# Patient Record
Sex: FEMALE | Race: BLACK OR AFRICAN AMERICAN | HISPANIC OR LATINO | URBAN - METROPOLITAN AREA
[De-identification: names, ages, dates, MRNs, and addresses within clinical notes are randomized per-mention and may not be internally consistent; named-entity substitution may affect disease eponyms.]

---

## 2017-08-24 PROBLEM — 70153002 HEMORRHOIDS: Status: ACTIVE | Noted: 2017-08-24

## 2017-09-21 PROBLEM — 62315008 DIARRHEA: Status: ACTIVE | Noted: 2017-09-21

## 2017-09-21 PROBLEM — 59621000 ESSENTIAL HYPERTENSION: Status: ACTIVE | Noted: 2017-09-21

## 2017-09-21 PROBLEM — 313436004 TYPE II DIABETES MELLITUS WITHOUT COMPLICATION: Status: ACTIVE | Noted: 2017-09-21

## 2017-11-01 PROBLEM — 429699009 HISTORY OF MALIGNANT NEOPLASM OF COLON: Status: ACTIVE | Noted: 2017-09-21

## 2017-11-01 PROBLEM — 92343006 BENIGN NEOPLASM OF SIGMOID COLON: Status: ACTIVE | Noted: 2017-11-01

## 2017-11-01 PROBLEM — 92318000 BENIGN NEOPLASM OF RECTUM: Status: ACTIVE | Noted: 2017-11-01

## 2017-11-01 PROBLEM — 91985000 BENIGN NEOPLASM OF ASCENDING COLON: Status: ACTIVE | Noted: 2017-11-01

## 2017-11-01 PROBLEM — 92076000 BENIGN NEOPLASM OF DESCENDING COLON: Status: ACTIVE | Noted: 2017-11-01

## 2021-09-20 ENCOUNTER — OFFICE VISIT (OUTPATIENT)
Dept: URBAN - METROPOLITAN AREA CLINIC 82 | Facility: CLINIC | Age: 66
End: 2021-09-20

## 2021-10-29 ENCOUNTER — LAB OUTSIDE AN ENCOUNTER (OUTPATIENT)
Dept: URBAN - METROPOLITAN AREA CLINIC 82 | Facility: CLINIC | Age: 66
End: 2021-10-29

## 2021-10-29 ENCOUNTER — OFFICE VISIT (OUTPATIENT)
Dept: URBAN - METROPOLITAN AREA CLINIC 82 | Facility: CLINIC | Age: 66
End: 2021-10-29
Payer: MEDICARE

## 2021-10-29 DIAGNOSIS — Z85.038 PERSONAL HISTORY OF COLON CANCER, STAGE III: ICD-10-CM

## 2021-10-29 DIAGNOSIS — Z86.010 PERSONAL HISTORY OF COLONIC POLYPS: ICD-10-CM

## 2021-10-29 DIAGNOSIS — D50.0 IRON DEFICIENCY ANEMIA DUE TO CHRONIC BLOOD LOSS: ICD-10-CM

## 2021-10-29 PROCEDURE — 99203 OFFICE O/P NEW LOW 30 MIN: CPT | Performed by: INTERNAL MEDICINE

## 2021-10-29 RX ORDER — METOCLOPRAMIDE HYDROCHLORIDE 10 MG/1
1 TABLET BEFORE MEALS FOR NAUSEA AND VOMITING. TAKE ONE PRIOR TO BOWEL PREP TABLET ORAL TWICE A DAY
Qty: 14 TABLET | Refills: 0 | OUTPATIENT
Start: 2021-10-29

## 2021-10-29 RX ORDER — POLYETHYLENE GLYCOL 3350 17 G/17G
AS DIRECTED PRIOR TO COLONOSCOPY POWDER, FOR SOLUTION ORAL ONCE
Qty: 238  GRAM | Refills: 0 | OUTPATIENT
Start: 2021-10-29 | End: 2021-10-30

## 2021-10-29 NOTE — HPI-TODAY'S VISIT:
Ms. Polly Mason is a 66-year-old  female came into the office for the evaluation of surveillance colonoscopy evaluation.  She is new to our practice used to see Magda pérez and Dr. Ortiz.  She was diagnosed with colon cancer right colon cancer in 2014 underwent right hemicolectomy as per records from the oncology she has stage III N0 M0 that required chemotherapy.  Her last colonoscopy was in 2017 and was noted to have multiple colon polyps.  However patient has not had a follow-up surveillance colonoscopy since then.  Currently she denies any abdominal pain she reports occasional nausea and a poor appetite.  She also reports diabetes is controlled on insulin.  Denies any vomiting.  She reports having history of iron deficiency anemia, she still have a hemoglobin around 10 do not have records available to confirm..  Denies any rectal bleeding.

## 2022-01-10 ENCOUNTER — OFFICE VISIT (OUTPATIENT)
Dept: URBAN - METROPOLITAN AREA SURGERY CENTER 13 | Facility: SURGERY CENTER | Age: 67
End: 2022-01-10

## 2022-08-08 ENCOUNTER — LAB OUTSIDE AN ENCOUNTER (OUTPATIENT)
Dept: URBAN - METROPOLITAN AREA CLINIC 82 | Facility: CLINIC | Age: 67
End: 2022-08-08

## 2022-08-08 ENCOUNTER — OFFICE VISIT (OUTPATIENT)
Dept: URBAN - METROPOLITAN AREA CLINIC 82 | Facility: CLINIC | Age: 67
End: 2022-08-08
Payer: MEDICARE

## 2022-08-08 VITALS
BODY MASS INDEX: 28.87 KG/M2 | TEMPERATURE: 98.3 F | HEART RATE: 82 BPM | SYSTOLIC BLOOD PRESSURE: 145 MMHG | WEIGHT: 179.6 LBS | DIASTOLIC BLOOD PRESSURE: 66 MMHG | HEIGHT: 66 IN

## 2022-08-08 DIAGNOSIS — Z85.030 PERSONAL HISTORY OF MALIGNANT CARCINOID TUMOR OF LARGE INTESTINE: ICD-10-CM

## 2022-08-08 DIAGNOSIS — K57.30 DIVERTICULOSIS OF LARGE INTESTINE WITHOUT PERFORATION OR ABSCESS WITHOUT BLEEDING: ICD-10-CM

## 2022-08-08 DIAGNOSIS — Z85.038 PERSONAL HISTORY OF COLON CANCER, STAGE III: ICD-10-CM

## 2022-08-08 DIAGNOSIS — D50.0 IRON DEFICIENCY ANEMIA DUE TO CHRONIC BLOOD LOSS: ICD-10-CM

## 2022-08-08 PROCEDURE — 99212 OFFICE O/P EST SF 10 MIN: CPT | Performed by: INTERNAL MEDICINE

## 2022-08-08 RX ORDER — METOCLOPRAMIDE HYDROCHLORIDE 10 MG/1
1 TABLET BEFORE MEALS FOR NAUSEA AND VOMITING. TAKE ONE PRIOR TO BOWEL PREP TABLET ORAL TWICE A DAY
Qty: 14 TABLET | Refills: 0 | Status: ACTIVE | COMMUNITY
Start: 2021-10-29

## 2022-08-08 NOTE — HPI-TODAY'S VISIT:
68 y/o  female with hx of CRC stage 3 s/p post Right hemicolectomy that came for f/up and schedule surveillance colonoscopy.Denies GI bleeding, refer weight gain.No smoke.refer anemia and soft stools.last colonoscopy 2017 with polyps.She was no show for colo 01/2022 with Dr Orellana.Labs shows anemia

## 2022-09-01 ENCOUNTER — OFFICE VISIT (OUTPATIENT)
Dept: URBAN - METROPOLITAN AREA SURGERY CENTER 13 | Facility: SURGERY CENTER | Age: 67
End: 2022-09-01

## 2022-09-02 ENCOUNTER — OUT OF OFFICE VISIT (OUTPATIENT)
Dept: URBAN - METROPOLITAN AREA MEDICAL CENTER 26 | Facility: MEDICAL CENTER | Age: 67
End: 2022-09-02
Payer: MEDICARE

## 2022-09-02 DIAGNOSIS — K63.89 BACTERIAL OVERGROWTH SYNDROME: ICD-10-CM

## 2022-09-02 DIAGNOSIS — Z85.038 H/O COLON CANCER, STAGE I: ICD-10-CM

## 2022-09-02 DIAGNOSIS — D64.89 ANEMIA DUE TO OTHER CAUSE: ICD-10-CM

## 2022-09-02 PROCEDURE — 45385 COLONOSCOPY W/LESION REMOVAL: CPT | Performed by: INTERNAL MEDICINE

## 2022-09-02 PROCEDURE — 99203 OFFICE O/P NEW LOW 30 MIN: CPT | Performed by: INTERNAL MEDICINE

## 2022-10-24 ENCOUNTER — OFFICE VISIT (OUTPATIENT)
Dept: URBAN - METROPOLITAN AREA CLINIC 82 | Facility: CLINIC | Age: 67
End: 2022-10-24
Payer: MEDICARE

## 2022-10-24 VITALS
HEART RATE: 80 BPM | BODY MASS INDEX: 27.93 KG/M2 | TEMPERATURE: 97.5 F | WEIGHT: 173.8 LBS | SYSTOLIC BLOOD PRESSURE: 124 MMHG | DIASTOLIC BLOOD PRESSURE: 69 MMHG | RESPIRATION RATE: 16 BRPM | HEIGHT: 66 IN

## 2022-10-24 DIAGNOSIS — R19.7 DIARRHEA, UNSPECIFIED TYPE: ICD-10-CM

## 2022-10-24 DIAGNOSIS — Z86.2 H/O SICKLE CELL ANEMIA: ICD-10-CM

## 2022-10-24 DIAGNOSIS — Z85.038 PERSONAL HISTORY OF COLON CANCER, STAGE III: ICD-10-CM

## 2022-10-24 PROBLEM — 724556004: Status: ACTIVE | Noted: 2021-10-29

## 2022-10-24 PROBLEM — 417279003: Status: ACTIVE | Noted: 2022-10-24

## 2022-10-24 PROBLEM — 123741000119102: Status: ACTIVE | Noted: 2017-08-24

## 2022-10-24 PROCEDURE — 99213 OFFICE O/P EST LOW 20 MIN: CPT | Performed by: STUDENT IN AN ORGANIZED HEALTH CARE EDUCATION/TRAINING PROGRAM

## 2022-10-24 RX ORDER — METOCLOPRAMIDE HYDROCHLORIDE 10 MG/1
1 TABLET BEFORE MEALS FOR NAUSEA AND VOMITING. TAKE ONE PRIOR TO BOWEL PREP TABLET ORAL TWICE A DAY
Qty: 14 TABLET | Refills: 0 | Status: ACTIVE | COMMUNITY
Start: 2021-10-29

## 2022-10-24 NOTE — HPI-TODAY'S VISIT:
68 y/o  female with hx of CRC stage 3 s/p right hemicolectomy that came for f/up for recent hospitalization from 9/1/22-9/3/22. Patient experienced a syncopal episode during her colonoscopy prep due to not understanding that she needs to hold her insulin.  While in the ED, hgb 9 prompted a colonoscopy that was completed by Dr. Castro. Colonoscopy showed  8 mm polyp in the transverse colon w/ bx result of hyperplastic polyp, recommend to repeat in 5 years.   At today visit, patient admits diarrhea after colonoscopy procedure. Denies any blood but does reports of foul odor. BM 2-3 per day. No abd pain, n/v, fatigue or weakness. Patients admits taking 2 OTC loperamide per day, which then caused constipation. However, will continue to experience diarrhea w.out the med. Hgb right before leaving the hospital was 7.7. She denies any recent bleeding, admits taking iron supplements as rx by PCP.

## 2022-10-25 LAB
A/G RATIO: 1.7
ABSOLUTE BASOPHILS: 47
ABSOLUTE EOSINOPHILS: 106
ABSOLUTE LYMPHOCYTES: 3646
ABSOLUTE MONOCYTES: 555
ABSOLUTE NEUTROPHILS: 7446
ALBUMIN: 4.6
ALKALINE PHOSPHATASE: 96
ALT (SGPT): 19
AST (SGOT): 17
BASOPHILS: 0.4
BILIRUBIN, TOTAL: 0.5
BUN/CREATININE RATIO: 22
BUN: 28
CALCIUM: 9.9
CARBON DIOXIDE, TOTAL: 26
CHLORIDE: 102
CREATININE: 1.3
EGFR: 46
EOSINOPHILS: 0.9
FERRITIN, SERUM: 86
GLOBULIN, TOTAL: 2.7
GLUCOSE: 248
HEMATOCRIT: 23
HEMOGLOBIN: 8.4
IRON BIND.CAP.(TIBC): 308
IRON SATURATION: 21
IRON: 66
LYMPHOCYTES: 30.9
MCH: 28.4
MCHC: 36.5
MCV: 77.7
MONOCYTES: 4.7
MPV: (no result)
NEUTROPHILS: 63.1
PLATELET COUNT: 425
POTASSIUM: 4.9
PROTEIN, TOTAL: 7.3
RDW: 17.6
RED BLOOD CELL COUNT: 2.96
SODIUM: 136
WHITE BLOOD CELL COUNT: 11.8

## 2022-11-08 ENCOUNTER — TELEPHONE ENCOUNTER (OUTPATIENT)
Dept: URBAN - METROPOLITAN AREA CLINIC 82 | Facility: CLINIC | Age: 67
End: 2022-11-08

## 2022-12-01 ENCOUNTER — OFFICE VISIT (OUTPATIENT)
Dept: URBAN - METROPOLITAN AREA CLINIC 82 | Facility: CLINIC | Age: 67
End: 2022-12-01

## 2022-12-01 ENCOUNTER — OFFICE VISIT (OUTPATIENT)
Dept: URBAN - METROPOLITAN AREA CLINIC 82 | Facility: CLINIC | Age: 67
End: 2022-12-01
Payer: MEDICARE

## 2022-12-01 VITALS
HEIGHT: 66 IN | TEMPERATURE: 97.3 F | DIASTOLIC BLOOD PRESSURE: 69 MMHG | SYSTOLIC BLOOD PRESSURE: 119 MMHG | BODY MASS INDEX: 28.57 KG/M2 | HEART RATE: 81 BPM | WEIGHT: 177.8 LBS

## 2022-12-01 DIAGNOSIS — Z85.038 PERSONAL HISTORY OF COLON CANCER, STAGE III: ICD-10-CM

## 2022-12-01 DIAGNOSIS — R19.7 DIARRHEA, UNSPECIFIED TYPE: ICD-10-CM

## 2022-12-01 DIAGNOSIS — K57.30 DIVERTICULOSIS OF LARGE INTESTINE WITHOUT PERFORATION OR ABSCESS WITHOUT BLEEDING: ICD-10-CM

## 2022-12-01 DIAGNOSIS — Z86.2 H/O SICKLE CELL ANEMIA: ICD-10-CM

## 2022-12-01 PROBLEM — 429699009: Status: ACTIVE | Noted: 2021-10-29

## 2022-12-01 PROBLEM — 62315008: Status: ACTIVE | Noted: 2022-10-24

## 2022-12-01 PROBLEM — 441482006: Status: ACTIVE | Noted: 2022-10-24

## 2022-12-01 PROBLEM — 398050005 DIVERTICULAR DISEASE OF COLON: Status: ACTIVE | Noted: 2017-08-24

## 2022-12-01 PROCEDURE — 99212 OFFICE O/P EST SF 10 MIN: CPT | Performed by: INTERNAL MEDICINE

## 2022-12-01 RX ORDER — METOCLOPRAMIDE HYDROCHLORIDE 10 MG/1
1 TABLET BEFORE MEALS FOR NAUSEA AND VOMITING. TAKE ONE PRIOR TO BOWEL PREP TABLET ORAL TWICE A DAY
Qty: 14 TABLET | Refills: 0 | Status: ACTIVE | COMMUNITY
Start: 2021-10-29

## 2022-12-01 NOTE — PHYSICAL EXAM GASTROINTESTINAL
normal bowel sounds , no masses palpable , no guarding or rigidity , soft, nontender, nondistended hard copy, drawn during this pregnancy

## 2022-12-01 NOTE — HPI-TODAY'S VISIT:
68 y/o Female that cam for follow up of chronic anemia 2/2 Sickle cell and chronic diarrhea.Recent colonoscopy with hyperplastic polyps, we request stools samples on last office visit but she never collect the samples.She is using imodium with partial response.No bleeding.

## 2023-09-15 ENCOUNTER — DASHBOARD ENCOUNTERS (OUTPATIENT)
Age: 68
End: 2023-09-15

## 2023-09-15 ENCOUNTER — OFFICE VISIT (OUTPATIENT)
Dept: URBAN - METROPOLITAN AREA CLINIC 82 | Facility: CLINIC | Age: 68
End: 2023-09-15
Payer: MEDICARE

## 2023-09-15 VITALS
TEMPERATURE: 97.7 F | HEIGHT: 66 IN | WEIGHT: 174.6 LBS | BODY MASS INDEX: 28.06 KG/M2 | SYSTOLIC BLOOD PRESSURE: 105 MMHG | DIASTOLIC BLOOD PRESSURE: 67 MMHG | HEART RATE: 86 BPM

## 2023-09-15 DIAGNOSIS — R19.5 POSITIVE COLORECTAL CANCER SCREENING USING COLOGUARD TEST: ICD-10-CM

## 2023-09-15 PROBLEM — 708699002: Status: ACTIVE | Noted: 2023-09-15

## 2023-09-15 PROCEDURE — 99214 OFFICE O/P EST MOD 30 MIN: CPT | Performed by: STUDENT IN AN ORGANIZED HEALTH CARE EDUCATION/TRAINING PROGRAM

## 2023-09-15 PROCEDURE — 99244 OFF/OP CNSLTJ NEW/EST MOD 40: CPT | Performed by: STUDENT IN AN ORGANIZED HEALTH CARE EDUCATION/TRAINING PROGRAM

## 2023-09-15 RX ORDER — METOCLOPRAMIDE HYDROCHLORIDE 10 MG/1
1 TABLET BEFORE MEALS FOR NAUSEA AND VOMITING. TAKE ONE PRIOR TO BOWEL PREP TABLET ORAL TWICE A DAY
Qty: 14 TABLET | Refills: 0 | Status: DISCONTINUED | COMMUNITY
Start: 2021-10-29

## 2023-09-15 NOTE — HPI-TODAY'S VISIT:
69 y/o female was referred by Dr. Gerardo Shah for an evaluation of  +cologuard. A copy of this note will be sent to the referring provider.  Cologuard on 06/2023 was positive. Colonoscopy 09/2022: Prep was well. The examined portion of the ileum was normal. Diverticulosis. 8 mm polyp in the transverse colon. Non-bleeding internal hemorrhoids. Repeat in 2027.  At this visit, patient denies any overtGI bleeding. No abd pain, N/V, changes in her BM. Diarrhea seems to improve. BM daily, no melena or hematochezia. No wt loss or nocturnal sx. Patient is doing well, she has no other concerns.